# Patient Record
(demographics unavailable — no encounter records)

---

## 2025-02-24 NOTE — ASSESSMENT
[FreeTextEntry1] : closed treatment of both bone forearm fracture reduced while placing cast well molded long arm cast placed  A cast was applied.  The importance of ice and elevation were discussed with the patient.  The risks were also discussed such as compartment syndrome and skin breakdown.  They were instructed to never put foreign objects down the cast.  Patients should call for increasing pain, worsening swelling, numbness, extremity discoloration, or any other concerns.   The patient was advised of the diagnosis. The natural history of the pathology was explained in full to the patient in layman's terms. All questions were answered. The risks and benefits of surgical and non-surgical treatment alternatives were explained in full to the patient. Risk of malunion, nonunion, permanent stiffness  NSAIDs recommended.  Patient warned of risk of NSAID medication to stomach and GI tract, risk of increase blood pressure, cardiac risk, and risk of fluid retention.  The patient should clear taking medication with internist/PMD if any problem with heart, blood pressure, or GI system exists.

## 2025-02-24 NOTE — HISTORY OF PRESENT ILLNESS
[Student] : Work status: student [de-identified] :  This is  Mr. FANNY VICTOR  a 7 year  old male who comes in today complaining of right forearm pain, injured when he fell from rock climbing off a wall 2/18/25. Patient was taken to [facility] where he had xrays and was splinted.  ARABELLA EVANS [FreeTextEntry1] : RT [employer]EARM

## 2025-02-24 NOTE — IMAGING
[de-identified] : right forearm- skin intact No deformity farom fingers NVID TTP about the midshaft ulna and radius  xrays- 2 views of the forearm taken today- fracture midshaft radius and unla, min displacement

## 2025-02-24 NOTE — IMAGING
[de-identified] : right forearm- skin intact No deformity farom fingers NVID TTP about the midshaft ulna and radius  xrays- 2 views of the forearm taken today- fracture midshaft radius and unla, min displacement

## 2025-02-24 NOTE — HISTORY OF PRESENT ILLNESS
[Student] : Work status: student [de-identified] :  This is  Mr. FANNY VICTOR  a 7 year  old male who comes in today complaining of right forearm pain, injured when he fell from rock climbing off a wall 2/18/25. Patient was taken to [facility] where he had xrays and was splinted.  ARABELLA EVANS [FreeTextEntry1] : RT FOREARM

## 2025-03-12 NOTE — HISTORY OF PRESENT ILLNESS
[Student] : Work status: student [de-identified] : 03/12/2025: Here for follow up for right forearm. Feeling better since last visit  2/20/25: This is  Mr. FANNY VICTOR  a 7 year  old male who comes in today complaining of right forearm pain, injured when he fell from rock climbing off a wall 2/18/25. Patient was taken to [facility] where he had xrays and was splinted.  ARABELLA EVANS [FreeTextEntry1] : RT [employer]EARM

## 2025-03-12 NOTE — ASSESSMENT
[FreeTextEntry1] : closed treatment of both bone forearm fracture The patient was advised of the diagnosis. The natural history of the pathology was explained in full to the patient in layman's terms. All questions were answered. The risks and benefits of surgical and non-surgical treatment alternatives were explained in full to the patient.  cast removed  gym and sports ok  NSAIDs recommended.  Patient warned of risk of NSAID medication to stomach and GI tract, risk of increase blood pressure, cardiac risk, and risk of fluid retention.  The patient should clear taking medication with internist/PMD if any problem with heart, blood pressure, or GI system exists.  rto prn

## 2025-03-12 NOTE — IMAGING
[de-identified] : right forearm- skin intact No deformity farom fingers NVID minimal TTP about the midshaft ulna   xrays- 2 views of the forearm taken today- healed fracture midshaft radius and ulna with callus  No